# Patient Record
Sex: FEMALE | Race: WHITE | URBAN - METROPOLITAN AREA
[De-identification: names, ages, dates, MRNs, and addresses within clinical notes are randomized per-mention and may not be internally consistent; named-entity substitution may affect disease eponyms.]

---

## 2017-03-06 ENCOUNTER — HOSPITAL ENCOUNTER (OUTPATIENT)
Dept: OTHER | Age: 57
Discharge: HOME | End: 2017-03-06
Attending: INTERNAL MEDICINE | Admitting: INTERNAL MEDICINE

## 2017-03-06 LAB
ANION GAP SERPL CALCULATED.3IONS-SCNC: 16 MMOL/L (ref 3–16)
BUN BLDV-MCNC: 14 MG/DL (ref 7–20)
CALCIUM SERPL-MCNC: 8.9 MG/DL (ref 8.3–10.6)
CHLORIDE BLD-SCNC: 86 MMOL/L (ref 99–110)
CO2: 30 MMOL/L (ref 21–32)
CREAT SERPL-MCNC: 0.9 MG/DL (ref 0.6–1.1)
CREATININE URINE: 17.7 MG/DL (ref 28–259)
GFR AFRICAN AMERICAN: >60
GFR NON-AFRICAN AMERICAN: >60
GLUCOSE BLD-MCNC: 102 MG/DL (ref 70–99)
HCT VFR BLD CALC: 34.5 % (ref 36–48)
HEMOGLOBIN: 11.3 G/DL (ref 12–16)
MAGNESIUM: 2.3 MG/DL (ref 1.8–2.4)
MCH RBC QN AUTO: 31.5 PG (ref 26–34)
MCHC RBC AUTO-ENTMCNC: 32.8 G/DL (ref 31–36)
MCV RBC AUTO: 96.1 FL (ref 80–100)
PARATHYROID HORMONE INTACT: 89.6 PG/ML (ref 14–72)
PDW BLD-RTO: 14.7 % (ref 12.4–15.4)
PHOSPHORUS: 3.3 MG/DL (ref 2.5–4.9)
PLATELET # BLD: 310 K/UL (ref 135–450)
PMV BLD AUTO: 9 FL (ref 5–10.5)
POTASSIUM SERPL-SCNC: 3.3 MMOL/L (ref 3.5–5.1)
PROTEIN PROTEIN: <4 MG/DL
RBC # BLD: 3.59 M/UL (ref 4–5.2)
SODIUM BLD-SCNC: 132 MMOL/L (ref 136–145)
VITAMIN D 25-HYDROXY: 21.2 NG/ML
WBC # BLD: 6.5 K/UL (ref 4–11)

## 2017-03-08 LAB
ALBUMIN SERPL-MCNC: 3.9 G/DL (ref 3.1–4.9)
ALPHA-1-GLOBULIN: 0.2 G/DL (ref 0.2–0.4)
ALPHA-2-GLOBULIN: 0.9 G/DL (ref 0.4–1.1)
BETA GLOBULIN: 1.2 G/DL (ref 0.9–1.6)
GAMMA GLOBULIN: 1.5 G/DL (ref 0.6–1.8)
SPE/IFE INTERPRETATION: NORMAL
TOTAL PROTEIN: 7.7 G/DL (ref 6.4–8.2)

## 2017-09-14 ENCOUNTER — HOSPITAL ENCOUNTER (OUTPATIENT)
Dept: OTHER | Age: 57
Discharge: HOME | End: 2017-09-14
Attending: PAIN MEDICINE | Admitting: PAIN MEDICINE

## 2017-09-14 DIAGNOSIS — M54.12 CERVICAL RADICULOPATHY: ICD-10-CM

## 2017-09-14 DIAGNOSIS — M47.816 OSTEOARTHRITIS OF LUMBAR SPINE, UNSPECIFIED SPINAL OSTEOARTHRITIS COMPLICATION STATUS: ICD-10-CM

## 2022-02-22 ENCOUNTER — APPOINTMENT (OUTPATIENT)
Dept: GENERAL RADIOLOGY | Age: 62
End: 2022-02-22
Payer: COMMERCIAL

## 2022-02-22 ENCOUNTER — HOSPITAL ENCOUNTER (EMERGENCY)
Age: 62
Discharge: ANOTHER HOSPITAL | End: 2022-02-22
Attending: EMERGENCY MEDICINE
Payer: COMMERCIAL

## 2022-02-22 ENCOUNTER — ANCILLARY PROCEDURE (OUTPATIENT)
Dept: EMERGENCY DEPT | Age: 62
End: 2022-02-22
Payer: COMMERCIAL

## 2022-02-22 ENCOUNTER — APPOINTMENT (OUTPATIENT)
Dept: CT IMAGING | Age: 62
End: 2022-02-22
Payer: COMMERCIAL

## 2022-02-22 VITALS
WEIGHT: 170 LBS | SYSTOLIC BLOOD PRESSURE: 99 MMHG | TEMPERATURE: 96.8 F | BODY MASS INDEX: 30.11 KG/M2 | RESPIRATION RATE: 12 BRPM | DIASTOLIC BLOOD PRESSURE: 52 MMHG | OXYGEN SATURATION: 100 % | HEART RATE: 86 BPM

## 2022-02-22 DIAGNOSIS — I60.7 CEREBRAL ANEURYSM RUPTURE (HCC): Primary | ICD-10-CM

## 2022-02-22 DIAGNOSIS — R41.82 ALTERED MENTAL STATUS, UNSPECIFIED ALTERED MENTAL STATUS TYPE: ICD-10-CM

## 2022-02-22 LAB
A/G RATIO: 1.2 (ref 1.1–2.2)
ACETAMINOPHEN LEVEL: <5 UG/ML (ref 10–30)
ALBUMIN SERPL-MCNC: 4.3 G/DL (ref 3.4–5)
ALP BLD-CCNC: 78 U/L (ref 40–129)
ALT SERPL-CCNC: 19 U/L (ref 10–40)
AMPHETAMINE SCREEN, URINE: ABNORMAL
ANION GAP SERPL CALCULATED.3IONS-SCNC: 14 MMOL/L (ref 3–16)
AST SERPL-CCNC: 50 U/L (ref 15–37)
BARBITURATE SCREEN URINE: ABNORMAL
BASE EXCESS VENOUS: -0.8 MMOL/L (ref -3–3)
BASOPHILS ABSOLUTE: 0.1 K/UL (ref 0–0.2)
BASOPHILS RELATIVE PERCENT: 0.4 %
BENZODIAZEPINE SCREEN, URINE: ABNORMAL
BILIRUB SERPL-MCNC: 0.3 MG/DL (ref 0–1)
BILIRUBIN URINE: NEGATIVE
BLOOD, URINE: NEGATIVE
BUN BLDV-MCNC: 17 MG/DL (ref 7–20)
CALCIUM SERPL-MCNC: 9.3 MG/DL (ref 8.3–10.6)
CANNABINOID SCREEN URINE: ABNORMAL
CARBOXYHEMOGLOBIN: 3.2 % (ref 0–1.5)
CHLORIDE BLD-SCNC: 98 MMOL/L (ref 99–110)
CLARITY: CLEAR
CO2: 23 MMOL/L (ref 21–32)
COCAINE METABOLITE SCREEN URINE: ABNORMAL
COLOR: ABNORMAL
CREAT SERPL-MCNC: 0.7 MG/DL (ref 0.6–1.1)
EOSINOPHILS ABSOLUTE: 0 K/UL (ref 0–0.6)
EOSINOPHILS RELATIVE PERCENT: 0 %
EPITHELIAL CELLS, UA: ABNORMAL /HPF (ref 0–5)
ETHANOL: NORMAL MG/DL (ref 0–0.08)
GFR AFRICAN AMERICAN: >60
GFR NON-AFRICAN AMERICAN: >60
GLUCOSE BLD-MCNC: 161 MG/DL (ref 70–99)
GLUCOSE URINE: 100 MG/DL
HCG QUALITATIVE: NEGATIVE
HCO3 VENOUS: 23.3 MMOL/L (ref 23–29)
HCT VFR BLD CALC: 36.4 % (ref 36–48)
HEMOGLOBIN: 12.2 G/DL (ref 12–16)
INR BLD: 1.05 (ref 0.88–1.12)
KETONES, URINE: NEGATIVE MG/DL
LACTIC ACID, SEPSIS: 1.7 MMOL/L (ref 0.4–1.9)
LEUKOCYTE ESTERASE, URINE: NEGATIVE
LIPASE: 33 U/L (ref 13–60)
LYMPHOCYTES ABSOLUTE: 0.4 K/UL (ref 1–5.1)
LYMPHOCYTES RELATIVE PERCENT: 2.3 %
Lab: ABNORMAL
MCH RBC QN AUTO: 33.9 PG (ref 26–34)
MCHC RBC AUTO-ENTMCNC: 33.6 G/DL (ref 31–36)
MCV RBC AUTO: 101.1 FL (ref 80–100)
METHADONE SCREEN, URINE: ABNORMAL
METHEMOGLOBIN VENOUS: 0.3 %
MICROSCOPIC EXAMINATION: ABNORMAL
MONOCYTES ABSOLUTE: 0.6 K/UL (ref 0–1.3)
MONOCYTES RELATIVE PERCENT: 3.5 %
NEUTROPHILS ABSOLUTE: 15.9 K/UL (ref 1.7–7.7)
NEUTROPHILS RELATIVE PERCENT: 93.8 %
NITRITE, URINE: NEGATIVE
O2 SAT, VEN: 56 %
O2 THERAPY: ABNORMAL
OPIATE SCREEN URINE: ABNORMAL
OXYCODONE URINE: POSITIVE
PCO2, VEN: 36.7 MMHG (ref 40–50)
PDW BLD-RTO: 13.8 % (ref 12.4–15.4)
PH UA: 8
PH UA: 8 (ref 5–8)
PH VENOUS: 7.42 (ref 7.35–7.45)
PHENCYCLIDINE SCREEN URINE: ABNORMAL
PLATELET # BLD: 327 K/UL (ref 135–450)
PMV BLD AUTO: 7.9 FL (ref 5–10.5)
PO2, VEN: 28.4 MMHG (ref 25–40)
POTASSIUM REFLEX MAGNESIUM: 5 MMOL/L (ref 3.5–5.1)
PRO-BNP: 504 PG/ML (ref 0–124)
PROPOXYPHENE SCREEN: ABNORMAL
PROTEIN UA: NEGATIVE MG/DL
PROTHROMBIN TIME: 11.9 SEC (ref 9.9–12.7)
RBC # BLD: 3.61 M/UL (ref 4–5.2)
RBC UA: ABNORMAL /HPF (ref 0–4)
SALICYLATE, SERUM: <0.3 MG/DL (ref 15–30)
SARS-COV-2, NAAT: ABNORMAL
SODIUM BLD-SCNC: 135 MMOL/L (ref 136–145)
SPECIFIC GRAVITY UA: 1.02 (ref 1–1.03)
SPECIMEN STATUS: NORMAL
TCO2 CALC VENOUS: 24 MMOL/L
TOTAL PROTEIN: 7.8 G/DL (ref 6.4–8.2)
TROPONIN: <0.01 NG/ML
URINE TYPE: ABNORMAL
UROBILINOGEN, URINE: 0.2 E.U./DL
WBC # BLD: 17 K/UL (ref 4–11)
WBC UA: ABNORMAL /HPF (ref 0–5)

## 2022-02-22 PROCEDURE — 82077 ASSAY SPEC XCP UR&BREATH IA: CPT

## 2022-02-22 PROCEDURE — 36556 INSERT NON-TUNNEL CV CATH: CPT

## 2022-02-22 PROCEDURE — 80179 DRUG ASSAY SALICYLATE: CPT

## 2022-02-22 PROCEDURE — 2580000003 HC RX 258: Performed by: EMERGENCY MEDICINE

## 2022-02-22 PROCEDURE — 71045 X-RAY EXAM CHEST 1 VIEW: CPT

## 2022-02-22 PROCEDURE — 2700000000 HC OXYGEN THERAPY PER DAY

## 2022-02-22 PROCEDURE — 76937 US GUIDE VASCULAR ACCESS: CPT

## 2022-02-22 PROCEDURE — 80053 COMPREHEN METABOLIC PANEL: CPT

## 2022-02-22 PROCEDURE — 83690 ASSAY OF LIPASE: CPT

## 2022-02-22 PROCEDURE — 85610 PROTHROMBIN TIME: CPT

## 2022-02-22 PROCEDURE — 2580000003 HC RX 258: Performed by: PHYSICIAN ASSISTANT

## 2022-02-22 PROCEDURE — 99283 EMERGENCY DEPT VISIT LOW MDM: CPT

## 2022-02-22 PROCEDURE — 70450 CT HEAD/BRAIN W/O DYE: CPT

## 2022-02-22 PROCEDURE — 31500 INSERT EMERGENCY AIRWAY: CPT

## 2022-02-22 PROCEDURE — 81001 URINALYSIS AUTO W/SCOPE: CPT

## 2022-02-22 PROCEDURE — 96368 THER/DIAG CONCURRENT INF: CPT

## 2022-02-22 PROCEDURE — 80307 DRUG TEST PRSMV CHEM ANLYZR: CPT

## 2022-02-22 PROCEDURE — 83605 ASSAY OF LACTIC ACID: CPT

## 2022-02-22 PROCEDURE — 94761 N-INVAS EAR/PLS OXIMETRY MLT: CPT

## 2022-02-22 PROCEDURE — 6360000002 HC RX W HCPCS

## 2022-02-22 PROCEDURE — 96366 THER/PROPH/DIAG IV INF ADDON: CPT

## 2022-02-22 PROCEDURE — 80143 DRUG ASSAY ACETAMINOPHEN: CPT

## 2022-02-22 PROCEDURE — 6360000002 HC RX W HCPCS: Performed by: EMERGENCY MEDICINE

## 2022-02-22 PROCEDURE — 96365 THER/PROPH/DIAG IV INF INIT: CPT

## 2022-02-22 PROCEDURE — 84703 CHORIONIC GONADOTROPIN ASSAY: CPT

## 2022-02-22 PROCEDURE — 3209999900 POC US FAST ABDOMEN LIMITED

## 2022-02-22 PROCEDURE — 87635 SARS-COV-2 COVID-19 AMP PRB: CPT

## 2022-02-22 PROCEDURE — 83880 ASSAY OF NATRIURETIC PEPTIDE: CPT

## 2022-02-22 PROCEDURE — 85025 COMPLETE CBC W/AUTO DIFF WBC: CPT

## 2022-02-22 PROCEDURE — 84484 ASSAY OF TROPONIN QUANT: CPT

## 2022-02-22 PROCEDURE — 82803 BLOOD GASES ANY COMBINATION: CPT

## 2022-02-22 PROCEDURE — 2500000003 HC RX 250 WO HCPCS: Performed by: EMERGENCY MEDICINE

## 2022-02-22 PROCEDURE — 2500000003 HC RX 250 WO HCPCS: Performed by: PHYSICIAN ASSISTANT

## 2022-02-22 PROCEDURE — 96375 TX/PRO/DX INJ NEW DRUG ADDON: CPT

## 2022-02-22 PROCEDURE — 2500000003 HC RX 250 WO HCPCS

## 2022-02-22 RX ORDER — PROPOFOL 10 MG/ML
5-50 INJECTION, EMULSION INTRAVENOUS
Status: DISCONTINUED | OUTPATIENT
Start: 2022-02-22 | End: 2022-02-22 | Stop reason: HOSPADM

## 2022-02-22 RX ORDER — 0.9 % SODIUM CHLORIDE 0.9 %
1000 INTRAVENOUS SOLUTION INTRAVENOUS ONCE
Status: COMPLETED | OUTPATIENT
Start: 2022-02-22 | End: 2022-02-22

## 2022-02-22 RX ORDER — SODIUM CHLORIDE, SODIUM LACTATE, POTASSIUM CHLORIDE, AND CALCIUM CHLORIDE .6; .31; .03; .02 G/100ML; G/100ML; G/100ML; G/100ML
1000 INJECTION, SOLUTION INTRAVENOUS ONCE
Status: COMPLETED | OUTPATIENT
Start: 2022-02-22 | End: 2022-02-22

## 2022-02-22 RX ORDER — PROPOFOL 10 MG/ML
INJECTION, EMULSION INTRAVENOUS
Status: COMPLETED
Start: 2022-02-22 | End: 2022-02-22

## 2022-02-22 RX ORDER — NALOXONE HYDROCHLORIDE 1 MG/ML
0.4 INJECTION INTRAMUSCULAR; INTRAVENOUS; SUBCUTANEOUS ONCE
Status: COMPLETED | OUTPATIENT
Start: 2022-02-22 | End: 2022-02-22

## 2022-02-22 RX ADMIN — NICARDIPINE HYDROCHLORIDE 15 MG/HR: 0.1 INJECTION, SOLUTION INTRAVENTRICULAR at 19:00

## 2022-02-22 RX ADMIN — SODIUM CHLORIDE 1000 ML: 9 INJECTION, SOLUTION INTRAVENOUS at 19:53

## 2022-02-22 RX ADMIN — SODIUM CHLORIDE, POTASSIUM CHLORIDE, SODIUM LACTATE AND CALCIUM CHLORIDE 1000 ML: 600; 310; 30; 20 INJECTION, SOLUTION INTRAVENOUS at 16:11

## 2022-02-22 RX ADMIN — DEXTROSE MONOHYDRATE 10 MCG/MIN: 50 INJECTION, SOLUTION INTRAVENOUS at 20:20

## 2022-02-22 RX ADMIN — FENTANYL CITRATE 50 MCG/HR: 50 INJECTION, SOLUTION INTRAMUSCULAR; INTRAVENOUS at 16:20

## 2022-02-22 RX ADMIN — LEVETIRACETAM 1500 MG: 100 INJECTION INTRAVENOUS at 17:16

## 2022-02-22 RX ADMIN — NALOXONE HYDROCHLORIDE 0.4 MG: 1 INJECTION PARENTERAL at 15:26

## 2022-02-22 RX ADMIN — PROPOFOL 20 MCG/KG/MIN: 10 INJECTION, EMULSION INTRAVENOUS at 16:14

## 2022-02-22 RX ADMIN — NICARDIPINE HYDROCHLORIDE 5 MG/HR: 0.1 INJECTION, SOLUTION INTRAVENTRICULAR at 16:13

## 2022-02-22 RX ADMIN — NICARDIPINE HYDROCHLORIDE 5 MG/HR: 0.1 INJECTION, SOLUTION INTRAVENOUS at 16:13

## 2022-02-22 RX ADMIN — SODIUM BICARBONATE 100 MEQ: 84 INJECTION, SOLUTION INTRAVENOUS at 20:21

## 2022-02-22 ASSESSMENT — ENCOUNTER SYMPTOMS: TACHYPNEA: 1

## 2022-02-22 NOTE — PROGRESS NOTES
02/22/22 1610   Vent Information   Skin Assessment Clean, dry, & intact   Vent Type 980   Vent Mode AC/VC   Vt Ordered 450 mL   Rate Set 12 bmp   FiO2  60 %   SpO2 100 %   SpO2/FiO2 ratio 166.67   Sensitivity 3   PEEP/CPAP 5   Humidification Source HME   Spontaneous Breathing Trial (SBT) RT Doc   Pulse 102   Additional Respiratory  Assessments   Resp 14   Position Semi-Urbano's   Alarm Settings   High Pressure Alarm 40 cmH2O   Apnea (secs) 20 secs   High Respiratory Rate 40 br/min   Low Exhaled Vt  200 mL   Patient Observation   Observations pt intubated by tex HARPER bag @ bedside   ETT (adult)   Placement Date/Time: 02/22/22 1604   Preoxygenation: Yes  Type: Cuffed  Tube Size: 7.5 mm  Laryngoscope: GlideScope  Blade Size: 4  Location: Oral  Insertion attempts: 1  Placement Verified By[de-identified] Auscultation;Colorimetric EtCO2 device; Chest X-ray  Secu. ..   $ Intubation emergent  $ Yes   Secured at 23 cm   Measured From Lips   ET Placement Left   Secured By Commercial tube rivero   Site Condition Dry   Cuff Pressure   (mov)

## 2022-02-22 NOTE — ED NOTES
Took over care for patient at this time. Pt intubated. ETT @ 23 lip. NG Right Nostril @ 65. Temp Tyson in place, draining. Patient in bilateral soft restraints. Bilateral 18 PIV R and L AC. Prop infusing at 25 mcg/kg/min. Fentanyl at 50 mcg/hr. Cardene at 5 mg/hr. Per previous RN BP to be trended to Systolic 088. Awaiting family arrival to update them of situation. Plan is to transfer to [facility] by [facility] MICU from [id number].      Angie Whelan, MALOU  02/22/22 9555

## 2022-02-22 NOTE — ED NOTES
Bed: 01  Expected date:   Expected time:   Means of arrival:   Comments:  julia James, MALOU  02/22/22 5731

## 2022-02-22 NOTE — PLAN OF CARE
I have spoken with the referring physician from [facility] ED regarding this patient and have reviewed the chart and images. Imaging shows extensive bifrontal hemorrhage involving the corpus callosum with intraventricular extension causing hydrocephalus. Briefly, patient is appropriate for admission for intracranial hemorrhage.       The immediate plan of care will involve   -intubation for airway protection (done)  -transfer to [facility] H[facility] AT [facility]   - admit to NSICU   - SBP <140 mmHg   - Keppra 500mg BID   - nimodopine per protocol   -EVD placement   - plan angiography for possible intervention

## 2022-02-22 NOTE — ED NOTES
20 mg Etomidate given @ 1601  100 mgROC given @ Jennifer [address] [place] [address]  02/22/22 0662

## 2022-02-22 NOTE — ED PROVIDER NOTES
**ADVANCED PRACTICE PROVIDER, I HAVE EVALUATED THIS [facility]  ED  EMERGENCY DEPARTMENT ENCOUNTER      Pt Name: Damian Noel  PCR:[id number]  Cookie 1960  Date of evaluation: 2/22/2022  Provider: LEROY Kee      Chief Complaint:    Chief Complaint   Patient presents with    Altered Mental Status     pt found unresponsive per  ems have 4 narcan         Nursing Notes, Past Medical Hx, Past Surgical Hx, Social Hx, Allergies, and Family Hx were all reviewed and agreed with or any disagreements were addressed in the HPI.    HPI: (Location, Duration, Timing, Severity, Quality, Assoc Sx, Context, Modifying factors)    Chief Complaint of altered mental status. This is a  63 y.o. female who presents via EMS for altered mental status. The patient was found unresponsive by her . EMS did give 4 of Narcan without significant improvement. The patient was found unresponsive in the bathroom by her . Last known well was last night, but uncertain how long she was down. EMS reports  reported PMH of hypertension. The  did report that he is prescribed Percocet and is uncertain if she took any at home. EMS did give her Narcan however there was no improvement. No other history was able to be obtained. PastMedical/Surgical History:      Diagnosis Date    Chronic back pain     Hypertension     Migraine          Procedure Laterality Date    BREAST LUMPECTOMY      left-benign    KNEE ARTHROSCOPY      TUBAL LIGATION         Medications:  Previous Medications    BUPROPION (WELLBUTRIN) 100 MG TABLET    Take 100 mg by mouth 2 times daily     BUSPIRONE (BUSPAR) 15 MG TABLET    Take 1 tablet by mouth 3 times daily as needed.     BUTALBITAL-ACETAMINOPHEN-CAFFEINE (FIORICET, ESGIC) -40 MG PER TABLET    TAKE ONE TABLET BY MOUTH EVERY 6 HOURS AS NEEDED FOR PAIN    GABAPENTIN (NEURONTIN) 300 MG CAPSULE    Take 1 capsule by mouth 3 times daily.    LISINOPRIL (PRINIVIL;ZESTRIL) 20 MG TABLET    Take 1 tablet by mouth 2 times daily. ONDANSETRON (ZOFRAN ODT) 4 MG DISINTEGRATING TABLET    Take 1 tablet by mouth every 8 hours as needed for Nausea    PANTOPRAZOLE (PROTONIX) 40 MG TABLET    Take 1 tablet by mouth daily    POTASSIUM CHLORIDE SA (K-DUR;KLOR-CON M) 10 MEQ TABLET    Take 1 tablet by mouth 2 times daily for 5 days         Review of Systems:  (2-9 systems needed)  Review of Systems   Unable to perform ROS: Acuity of condition         Physical Exam:  Physical Exam  Vitals reviewed. Cardiovascular:      Rate and Rhythm: Normal rate and regular rhythm. Heart sounds: Normal heart sounds. No murmur heard. No friction rub. No gallop. Pulmonary:      Effort: Pulmonary effort is normal.      Breath sounds: Normal breath sounds. No wheezing, rhonchi or rales. Abdominal:      General: There is no distension. Palpations: Abdomen is soft. There is no mass. Tenderness: There is no guarding. Neurological:      Mental Status: She is unresponsive. Comments: Pupils are pinpoint and deviate to the right. She is responsive to painful stimuli. Spontaneously moving bilateral upper extremities.           MEDICAL DECISION MAKING    Vitals:    Vitals:    02/22/22 1944 02/22/22 1945 02/22/22 1947 02/22/22 1949   BP: (!) 80/42 (!) 74/41 (!) 64/44 (!) 57/38   Pulse: 97 101 109 89   Resp: 16 12 12 12   Temp:       TempSrc:       SpO2: 100% 100% 100% 100%   Weight:           LABS:  Labs Reviewed   COVID-19, RAPID - Abnormal; Notable for the following components:       Result Value    SARS-CoV-2, NAAT Indeterminate (*)     All other components within normal limits    Narrative:     Performed at:  [facility]) - [address],  [place], [facility], [place], operated by [facility]   Phone [phone]   CBC WITH AUTO DIFFERENTIAL - Abnormal; Notable for the following components:    WBC 17.0 (*)     RBC 3.61 (*)     .1 (*)     Neutrophils Absolute 15.9 (*)     Lymphocytes Absolute 0.4 (*)     All other components within normal limits    Narrative:     Performed at:  [address],  [place], [address] [employer]   Phone [phone]   COMPREHENSIVE METABOLIC PANEL W/ REFLEX TO MG FOR LOW K - Abnormal; Notable for the following components:    Sodium 135 (*)     Chloride 98 (*)     Glucose 161 (*)     AST 50 (*)     All other components within normal limits    Narrative:     Performed at:  [address] [place] [address] [address],  [place], [address] [employer]   Phone [phone]   BRAIN NATRIURETIC PEPTIDE - Abnormal; Notable for the following components:    Pro- (*)     All other components within normal limits    Narrative:     Performed at:  [address],  [place], [address] [employer]   Phone [phone]   URINALYSIS WITH MICROSCOPIC - Abnormal; Notable for the following components:    Glucose, Ur 100 (*)     All other components within normal limits    Narrative:     Performed at:  [address],  [place], [address] [employer]   Phone [phone]   BLOOD GAS, VENOUS - Abnormal; Notable for the following components:    pCO2, Isaac 36.7 (*)     Carboxyhemoglobin 3.2 (*)     All other components within normal limits    Narrative:     Performed at:  [address] [place] [address] [address],  [place], [address] [employer]   Phone [phone] LEVEL - Abnormal; Notable for the following components:    Acetaminophen Level <5 (*)     All other components within normal limits    Narrative:     Performed at:  [address] [place] [address] [address],  [place], [address] [employer]   Phone [phone]   SALICYLATE LEVEL - Abnormal; Notable for the following components:    Salicylate, Serum <0.0 (*)     All other components within normal limits    Narrative:     Performed at:  [place] [address] [place]   Phone [phone]   URINE DRUG SCREEN - Abnormal; Notable for the following components:    Oxycodone Urine POSITIVE (*)     All other components within normal limits    Narrative:     Performed at:  [address], [place] [employer] [place]   Phone [phone]   LIPASE    Narrative:     Performed at:  [place] [address] [employer] [place]   Phone [phone]   TROPONIN    Narrative:     Performed at:  [place] [address] [employer]   Phone [phone]   PROTIME-INR    Narrative:     Performed at:  [place] [address] [employer]   Phone [phone]   HCG, SERUM, QUALITATIVE    Narrative:     Performed at:  [place] [address] [employer] [place]   Phone [phone]   LACTATE, SEPSIS    Narrative:     Performed at:  [address] [place]   Phone [phone]   ETHANOL    Narrative:     Performed at:  [place] [address] [place]   Phone [phone]   SAMPLE POSSIBLE BLOOD BANK TESTING    Narrative:     Performed at:  [place] [address] [place]   Phone [phone]   LACTATE, SEPSIS        Remainder of labs reviewed and were negative at this time or not returned at the time of this note.     RADIOLOGY:   Non-plain film images such as CT, Ultrasound and MRI are read by the radiologist. LEROY Al have directly visualized the radiologic plain film image(s) with the below findings:      Interpretation per the Radiologist below, if available at the time of this note:    XR CHEST PORTABLE Final Result   Endotracheal and enteric tubes in satisfactory position. CT Head WO Contrast   Final Result   1. Large pericallosal intracranial (suspected subarachnoid) hemorrhage with   intraventricular extension of hemorrhage, ventriculomegaly, and possible mild   subdural hemorrhage. Location of hemorrhage raises suspicion for a ruptured   aneurysm, possibly anterior communicating or pericallosal artery. 2. Predominately left-sided paranasal sinus disease. Preliminary findings discussed with ordering physician Dr. Tanner Kohler on   02/22/2022 at 4:05 p.m. POC US FOR VASCULAR ACCESS    (Results Pending)   US Ed Fast Abdomen Limited    (Results Pending)        CT Head WO Contrast    Result Date: 2/22/2022  EXAMINATION: CT OF THE HEAD WITHOUT CONTRAST  2/22/2022 3:40 pm TECHNIQUE: CT of the head was performed without the administration of intravenous contrast. Dose modulation, iterative reconstruction, and/or weight based adjustment of the mA/kV was utilized to reduce the radiation dose to as low as reasonably achievable. COMPARISON: None. HISTORY: ORDERING SYSTEM PROVIDED HISTORY: AMS, fall TECHNOLOGIST PROVIDED HISTORY: Reason for exam:->AMS, fall Has a \"code stroke\" or \"stroke alert\" been called? ->No Decision Support Exception - unselect if not a suspected or confirmed emergency medical condition->Emergency Medical Condition (MA) Reason for Exam: ams; pt unresponsive FINDINGS: BRAIN/VENTRICLES: Large pericallosal intracranial (suspected subarachnoid) hemorrhage measuring approximately 8.2 x 5.2 x 2.4 cm with intraventricular extension of hemorrhage and associated ventriculomegaly. Mild thickening of the falx may be related to small subdural hemorrhage. No convincing evidence of uncal or transtentorial herniation. The gray-white matter differentiation is relatively preserved. ORBITS: The visualized portion of the orbits demonstrate no acute abnormality.  SINUSES: Complete opacification of the left frontal and maxillary sinuses. Partial opacification of the left greater than the right ethmoid sinuses. Mild mucosal thickening of the bilateral sphenoid and right maxillary sinus. Hyperdense components may represent inspissated secretions. Mastoid air cells appear clear. SOFT TISSUES/SKULL:  No acute abnormality of the visualized skull or soft tissues. 1. Large pericallosal intracranial (suspected subarachnoid) hemorrhage with intraventricular extension of hemorrhage, ventriculomegaly, and possible mild subdural hemorrhage. Location of hemorrhage raises suspicion for a ruptured aneurysm, possibly anterior communicating or pericallosal artery. 2. Predominately left-sided paranasal sinus disease. Preliminary findings discussed with ordering physician Dr. Darron Cantu on 02/22/2022 at 4:05 p.m. XR CHEST PORTABLE    Result Date: 2/22/2022  EXAMINATION: ONE XRAY VIEW OF THE CHEST 2/22/2022 4:28 pm COMPARISON: Chest x-ray dated 09/22/2015. HISTORY: ORDERING SYSTEM PROVIDED HISTORY: et tube placement TECHNOLOGIST PROVIDED HISTORY: Reason for exam:->et tube placement Reason for Exam: et tube placement and NG FINDINGS: LINES/TUBES/OTHER: Endotracheal tube is noted with its tip approximately 3.2 cm from the pancho. Enteric tube is noted with its side port and tip projecting over the area of the stomach. HEART/MEDIASTINUM: The cardiomediastinal silhouette is within normal limits. PLEURA/LUNGS: There are no focal consolidations or pleural effusions. There is no appreciable pneumothorax. BONES/SOFT TISSUE: No acute abnormality. Endotracheal and enteric tubes in satisfactory position. MEDICAL DECISION MAKING / ED COURSE:      PROCEDURES:   PROCEDURE:  ENDOTRACHEAL INTUBATION  The benefits, risks, and alternatives were unable to be discussed with Ninoska Haddad or their surrogate. Ninoska Haddad was pre-oxygenated as best as possible. Suction was ready.  The patient was sedated, then paralyzed; please see the chart for the medications and dosages administered. The vocal cords were visualized, and the endotracheal tube was inserted without complication. Tracheal position was confirmed using auscultation, capnometry, tube condensation, and chest x-ray. The tube was appropriately secured. Sedation was provided for endotracheal tube and ventilatory comfort. Central Line Placement Procedure Note    Indication: vascular access    Consent: Unable to be obtained due to patient's condition. Procedure: The patient was positioned appropriately and the skin over the right femoral vein was prepped with alcohol. Local anesthesia was not performed due to the emergent nature of this procedure. A large bore needle was used to identify the vein. A guide wire was then inserted into the vein through the needle. A triple lumen catheter was then inserted into the vessel over the guide wire using the Seldinger technique. All ports showed good, free flowing blood return and were flushed with saline solution. The catheter was then securely fastened to the skin with sutures and covered with a sterile dressing. A post procedure X-ray was ordered and showed good line position. The patient tolerated the procedure well.     Complications: None      Patient was given:  Medications   niCARdipine (CARDENE) 20 mg in 0.9 % sodium chloride 200 mL infusion (12.5 mg/hr IntraVENous Rate/Dose Change 2/22/22 1944)   fentaNYL (SUBLIMAZE) 1,000 mcg in sodium chloride 0.9 % 100 mL infusion (75 mcg/hr IntraVENous Rate/Dose Change 2/22/22 1909)   propofol injection (40 mcg/kg/min × 77.1 kg IntraVENous Rate/Dose Change 2/22/22 1927)   labetalol (NORMODYNE;TRANDATE) 300 mg in dextrose 5 % 300 mL infusion (has no administration in time range)   0.9 % sodium chloride bolus (1,000 mLs IntraVENous New Bag 2/22/22 1953)   naloxone [facility]) injection 0.4 mg (0.4 mg IntraVENous Given 2/22/22 1526)   lactated ringers bolus (0 mLs IntraVENous Stopped 2/22/22 1711)   levETIRAcetam (KEPPRA) 1,500 mg in sodium chloride 0.9 % 100 mL IVPB (0 mg IntraVENous Stopped 2/22/22 1732)     The patient was brought into the emergency department by EMS for altered mental status. Very limited history is obtainable at this time. Per chart review the patient does have past medical history of seizures although appears to not have followed up in the last 3 years. The patient was seen and evaluated in the emergency department by myself and attending physician. All diagnostic, treatment, and disposition decisions were made in conjunction with attending physician. The patient initially presented responsive to painful stimuli, her pupils were pinpoint. She was given additional Narcan without significant improvement again. Patient does have mild right gaze deviation. The patient is currently maintaining her airway, therefore lab work is obtained and the patient is taken to CT for imaging. Differential diagnosis at this point includes stroke, subarachnoid hemorrhage, overdose, seizure. The patient is found to have a very significant intracranial bleed which is suspected to be a subarachnoid hemorrhage with intraventricular extension of hemorrhage, ventriculomegaly and possible mild subdural hemorrhage. It is suspected that this is a ruptured aneurysm. At this time it is decided to intubate the patient to protect her airway. She is also started on a Cardene infusion for blood pressure control with systolic blood pressure goal of less than 140. Neurosurgery was consulted and spoke with attending physician. It is decided the patient will be transferred to [address] for neuro intervention. A central line was placed in right femoral for additional access. She was also given 1500 mg of Keppra for seizure prophylaxis.   The patient's pressures did initially respond to the Cardene drip however I was notified by nursing staff that her pressure had began to rise up to 160 and she was maxed out on the Cardene drip therefore a labetalol drip was ordered. Additional work-up results:  CBC with white blood cell count of 17, slight neutrophilic shift, maintain hemoglobin and hematocrit. CMP with no evidence of acute electrolyte abnormality and maintain renal function  Lipase is negative  Troponin is negative  BNP slightly elevated at 504  Negative pregnancy test  Urinalysis was unremarkable  Lactic is 1.7  PT/INR 0.52  Salicylate and acetaminophen and ethanol levels undetected  A Covid swab was obtained for anticipated transfer and found to be indeterminate twice. The attending physician and I did discuss the patient's condition with the patient's family including her son, daughter, and . Prior to initiating labetalol drip as per the nursing staff that the patient's blood pressure had significantly decreased over the course of about 4 minutes from 689 systolic to 70 systolic. Cardene drip was halted. Blood pressure continue to decrease therefore she was given 1 L of IV fluids with a pressure bag. Attending physician placed an arterial line. The patient's blood pressure was as low as 30 systolic therefore we ordered Levophed initiated. However following the fluids and Levophed drip initiation the patient's blood pressure improved to 90 systolic. Therefore Levophed and bicarb are held. At this time transport did arrive and agreed to transport the patient to [address] [place] [address]:  1. Cerebral aneurysm rupture ([facility]    2. Altered mental status, unspecified altered mental status type        CRITICAL CARE  Total critical care time provided by myself today provided was 85 minutes. This excludes seperately billable procedures and family discussion time. Time provided was for intubation, central line placement, and strict blood pressure management requiring frequent evaluations for potential decompensation.         DISPOSITION Decision To Transfer 02/22/2022 05:30:31 PM      PATIENT REFERRED TO:  No follow-up provider specified.     DISCHARGE MEDICATIONS:  New Prescriptions    No medications on file       DISCONTINUED MEDICATIONS:  Discontinued Medications    No medications on file              (Please note the MDM and HPI sections of this note were completed with a voice recognition program.  Efforts were made to edit the dictations but occasionally words are mis-transcribed.)    Electronically signed, Fadi Snider,           Fadi Snider  02/22/22 2038

## 2022-02-22 NOTE — ED NOTES
Cali HARPER began intubation @ 1602  Color changed noted @ 7520  ET tube at 23 at the lip. Bilateral lung sounds noted.       Cora Dickerson RN  02/22/22 5310

## 2022-02-22 NOTE — ED NOTES
MALOU Skaggs talked to family on the phone saying it was okay to come in.       Greyson Pollard RN  02/22/22 3131

## 2022-02-22 NOTE — ED NOTES
Bilateral wrist restraints placed @ 2490 per Dr. Jayesh Avendano.       Franchesca Haji RN  02/22/22 0612

## 2022-02-22 NOTE — ED NOTES
1555 - called [facility] access center and spoke with RN [address] [facility] to reach [facility]surgery [facility]   Re: subdural bleed  1603 - NP Baljit Lorenzo called back via [facility] access line to speak with Dr Jian Thomas. Due to pt having suspected aneurism rupture leading to a bleed, pt may need to go to  or [address] [place] [address] access initiated transfer  0771 - Dr Cooper called back from [facility] to speak with Dr Jian Thomas. Pt accepted to [facility].  Awaiting bed at [address] [place] [address] - Dr Daly Carlos called back from [facility] to speak with Dr Jian Thomas  8744 - [facility] access line called back with bed assignment at [facility]  BED: 7104  REPORT: [address] access line to call back with transport ETA  8137 -  MICU ETA [phone]       Palmer Preciado  02/22/22 793 [facility]

## 2022-02-22 NOTE — ED PROVIDER NOTES
I independently performed a history and physical on [employer]. All diagnostic, treatment, and disposition decisions were made by myself in conjunction with the advanced practice provider. For further details of Hammad Hastings's emergency department encounter, please see the KIKI/resident's documentation. I personally saw the patient and performed a substantive portion of the visit including all aspects of the medical decision making. Briefly, this is a 62-year-old female who presented per EMS for evaluation of altered mental status. She was found unresponsive on the ground in her bathroom. Unknown downtime over the last normal.  She was noticed to have pinpoint pupils, given Narcan without effect. Patient arrives with continued pinpoint pupils, withdrawing to pain in the lower extremities, occasional movements of the bilateral upper extremities, decerebrate posturing. She has somewhat right gaze deviation. She is soiled. 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 She was intubated for airway protection and started on Cardene infusion for blood pressure control with systolic blood pressure goal less than 140 mmHg. Neurosurgery was emergently consulted to recommended transfer to [facility] for neuro interventional intervention. I spoke with Dr. Nela Condon. Patient required central line insertion for access. She was loaded Keppra 1500 mg for seizure prophylaxis. I spoke with the intensivist at [facility] who is accepted transfer. She will be transferred via critical care. Shortly prior to transfer, patient became hypotensive to the 61A systolics. She was only on nicardipine at that time and this was stopped. IV fluid bolus with normal saline was started and did not significant improve her blood pressure.   An arterial line was placed and this continue to read low blood pressure. She is given multiple amps of sodium bicarb and started on Levophed. Unclear etiology for hypotension could be related to Cardene infusion she has had no change in her neurological exam.  Blood pressure improved to the 90s with Levophed and patient was transferred in critical condition. I personally saw the patient and independently provided 120 minutes of non-concurrent critical care out of the total shared critical care time provided. Jim Panchal MD  02/22/22 5423        Arterial Line Placement Procedure Note  Indication: BP monitoring    Consent: Unable to be obtained due to patient's condition. Procedure: The patient was positioned appropriately and the skin over the right femoral artery was prepped with chlorhexidine. Local anesthesia was not performed due to the emergent nature of this procedure. A large bore needle was used to identify the artery  A guide wire was then inserted into the vein through the needle. A single lumen catheter was then inserted into the vessel over the guide wire using the Seldinger technique. The catheter was then securely fastened to the skin with sutures and covered with a sterile dressing. A post procedure X-ray was not indicated. The patient tolerated the procedure well.     Complications: None       Jim Panchal MD  02/22/22 7521       Jim Panchal MD  02/22/22 9211

## 2022-02-22 NOTE — ED NOTES
Dr. Bijan Gerber at bedside placing central line  @ [address] [place] [address], [address] [place] [address]  02/22/22 1370

## 2022-02-22 NOTE — ED NOTES
Respiratory at bedside @ 1550  Dr. Ford Mathews and [facility] PA at bedside doing assessment preparing to intubate pt.       Izabela Medel, RN  02/22/22 [address], RN  02/22/22 1317

## 2022-02-23 NOTE — ED NOTES
Drips remain paused.  Mobile at bedside. Report given. Dr. Osiel Fulton in room. NS Bolus infusing.       Wyatt Young RN  02/22/22 2005

## 2022-02-23 NOTE — ED NOTES
Report given to [facility]har[facility] Neuro floor RN.      Jonas Coppola, MALOU  02/22/22 2051

## 2022-02-23 NOTE — ED NOTES
Holding Both Prop and Cardene. Sybil Gaucher, [place] shahnaz.      Alycia Barahona, MALOU  02/22/22 1950

## 2022-02-23 NOTE — ED NOTES
Discussed BP with dr. Torrey Almaguer.  Cardene stopped for 2 minutes, then to be restarted at 10 mg/hr       Gil Garrido RN  02/22/22 1946

## 2022-02-23 NOTE — ED NOTES
Verbal discussion DR BD phenyl push and send norepi with squad.      Blanca Mulligan, RN  02/22/22 2010